# Patient Record
Sex: MALE | Race: WHITE | NOT HISPANIC OR LATINO | Employment: OTHER | URBAN - METROPOLITAN AREA
[De-identification: names, ages, dates, MRNs, and addresses within clinical notes are randomized per-mention and may not be internally consistent; named-entity substitution may affect disease eponyms.]

---

## 2021-12-23 ENCOUNTER — TELEPHONE (OUTPATIENT)
Dept: PODIATRY | Facility: CLINIC | Age: 81
End: 2021-12-23

## 2021-12-27 ENCOUNTER — TELEPHONE (OUTPATIENT)
Dept: FAMILY MEDICINE | Facility: CLINIC | Age: 81
End: 2021-12-27

## 2021-12-28 ENCOUNTER — TELEPHONE (OUTPATIENT)
Dept: PODIATRY | Facility: CLINIC | Age: 81
End: 2021-12-28

## 2022-01-10 ENCOUNTER — OFFICE VISIT (OUTPATIENT)
Dept: PODIATRY | Facility: CLINIC | Age: 82
End: 2022-01-10
Payer: MEDICARE

## 2022-01-10 VITALS
DIASTOLIC BLOOD PRESSURE: 91 MMHG | SYSTOLIC BLOOD PRESSURE: 153 MMHG | HEART RATE: 84 BPM | WEIGHT: 212.63 LBS | TEMPERATURE: 98 F | RESPIRATION RATE: 18 BRPM | BODY MASS INDEX: 27.29 KG/M2 | HEIGHT: 74 IN

## 2022-01-10 DIAGNOSIS — L03.032 PARONYCHIA OF GREAT TOE, LEFT: ICD-10-CM

## 2022-01-10 DIAGNOSIS — L60.0 INGROWN NAIL: Primary | ICD-10-CM

## 2022-01-10 PROCEDURE — 99202 OFFICE O/P NEW SF 15 MIN: CPT | Mod: S$GLB,,, | Performed by: PODIATRIST

## 2022-01-10 PROCEDURE — 99999 PR PBB SHADOW E&M-EST. PATIENT-LVL IV: CPT | Mod: PBBFAC,,, | Performed by: PODIATRIST

## 2022-01-10 PROCEDURE — 99202 PR OFFICE/OUTPT VISIT, NEW, LEVL II, 15-29 MIN: ICD-10-PCS | Mod: S$GLB,,, | Performed by: PODIATRIST

## 2022-01-10 PROCEDURE — 99999 PR PBB SHADOW E&M-EST. PATIENT-LVL IV: ICD-10-PCS | Mod: PBBFAC,,, | Performed by: PODIATRIST

## 2022-01-10 RX ORDER — HYDROCORTISONE 25 MG/G
CREAM TOPICAL
COMMUNITY
Start: 2021-07-22 | End: 2022-07-23

## 2022-01-10 RX ORDER — TAMSULOSIN HYDROCHLORIDE 0.4 MG/1
CAPSULE ORAL
COMMUNITY
Start: 2021-11-26

## 2022-01-10 RX ORDER — PRAVASTATIN SODIUM 80 MG/1
80 TABLET ORAL
COMMUNITY
Start: 2022-01-03

## 2022-01-10 RX ORDER — FINASTERIDE 5 MG/1
5 TABLET, FILM COATED ORAL
COMMUNITY
Start: 2021-10-26

## 2022-01-10 RX ORDER — MONTELUKAST SODIUM 10 MG/1
TABLET ORAL
COMMUNITY
Start: 2021-10-22

## 2022-01-10 RX ORDER — PANTOPRAZOLE SODIUM 40 MG/1
TABLET, DELAYED RELEASE ORAL
COMMUNITY
Start: 2021-11-26

## 2022-01-10 RX ORDER — METFORMIN HYDROCHLORIDE 500 MG/5ML
SOLUTION ORAL
COMMUNITY
Start: 2021-10-22

## 2022-01-10 RX ORDER — LOSARTAN POTASSIUM 25 MG/1
25 TABLET ORAL
COMMUNITY
Start: 2022-01-03

## 2022-01-10 RX ORDER — ASCORBIC ACID 500 MG
500 TABLET ORAL
COMMUNITY
Start: 2021-05-13

## 2022-01-12 PROBLEM — L03.032 PARONYCHIA OF GREAT TOE, LEFT: Status: ACTIVE | Noted: 2022-01-12

## 2022-01-12 PROBLEM — L60.0 INGROWN NAIL: Status: ACTIVE | Noted: 2022-01-12

## 2022-01-12 NOTE — PROGRESS NOTES
Subjective:       Patient ID: Nemesio Davis is a 81 y.o. male.    Chief Complaint: Nail Problem    Patient presents today for a new patient evaluation he is complaining about a painfully ingrown toenail on his left great toe this has been going on for about 2-3 months.  Patient states he has not had chronic problems with ingrowing toenails and this is mainly only painful if something touches his toe.  Past Medical History:   Diagnosis Date    Allergy     GERD (gastroesophageal reflux disease)     Hyperlipidemia     Hypertension      Past Surgical History:   Procedure Laterality Date    ADENOIDECTOMY      APPENDECTOMY      CHOLECYSTECTOMY      TONSILLECTOMY       Family History   Problem Relation Age of Onset    Cancer Mother     No Known Problems Father      Social History     Socioeconomic History    Marital status:    Tobacco Use    Smoking status: Former Smoker    Smokeless tobacco: Never Used    Tobacco comment: Quit 1969   Substance and Sexual Activity    Alcohol use: Not Currently       Current Outpatient Medications   Medication Sig Dispense Refill    ascorbic acid, vitamin C, (VITAMIN C) 500 MG tablet 500 mg.      finasteride (PROSCAR) 5 mg tablet 5 mg.      losartan (COZAAR) 25 MG tablet 25 mg.      metFORMIN 500 mg/5 mL Soln   See Instructions, TAKE 1 TABLET EVERY DAY WITH EVENING MEAL, # 90 tab, 3 Refill(s), Pharmacy: [facility], 186, cm, 06/28/21 9:51:00 CDT, Height/Length Measured, 91.7, kg, 06/28/21 9:51:00 CDT, Weight Dosing      montelukast (SINGULAIR) 10 mg tablet   See Instructions, TAKE 1 TABLET ONE TIME DAILY AS NEEDED FOR ALLERGY SYMPTOMS, # 90 tab, 3 Refill(s), Pharmacy: [facility], 186, cm, 06/28/21 9:51:00 CDT, Height/Length Measured, 91.7, kg, 06/28/21 9:51:00 CDT, Weight Dosing      pantoprazole (PROTONIX) 40 MG tablet   = 1 tab, Oral, Daily, PRN AS NEEDED FOR  INDIGESTION, # 90 tab, 3 Refill(s), Pharmacy: [facility]  "Mail Delivery, 186, cm, 10/26/21 11:13:00 CDT, Height/Length Measured, 96.5, kg, 10/26/21 11:13:00 CDT, Weight Dosing      pravastatin (PRAVACHOL) 80 MG tablet 80 mg.      tamsulosin (FLOMAX) 0.4 mg Cap   = 1 cap, Oral, BID, # 180 cap, 3 Refill(s), Pharmacy: [facility], 186, cm, 10/26/21 11:13:00 CDT, Height/Length Measured, 96.5, kg, 10/26/21 11:13:00 CDT, Weight Dosing      hydrocortisone (ANUSOL-HC) 2.5 % rectal cream   1 es, Rectal, BID, PRN hemorrhoids, # 30 gm, 2 Refill(s), Pharmacy: [id number], 186, cm, 06/28/21 9:51:00 CDT, Height/Length Measured, 91.7, kg, 06/28/21 9:51:00 CDT, Weight Dosing       No current facility-administered medications for this visit.     Review of patient's allergies indicates:   Allergen Reactions    Lisinopril      Other reaction(s): cough       Review of Systems   All other systems reviewed and are negative.      Objective:      Vitals:    01/10/22 1535   BP: (!) 153/91   Pulse: 84   Resp: 18   Temp: 98.2 °F (36.8 °C)   TempSrc: Oral   Weight: 96.4 kg (212 lb 9.6 oz)   Height: 6' 2" (1.88 m)     Physical Exam  Vitals and nursing note reviewed.   Constitutional:       Appearance: Normal appearance.   Cardiovascular:      Pulses:           Dorsalis pedis pulses are 1+ on the right side and 1+ on the left side.        Posterior tibial pulses are 1+ on the right side and 1+ on the left side.   Pulmonary:      Effort: Pulmonary effort is normal.   Musculoskeletal:         General: Tenderness present.   Feet:      Right foot:      Protective Sensation: 2 sites tested. 2 sites sensed.      Toenail Condition: Fungal disease present.     Left foot:      Protective Sensation: 2 sites tested. 2 sites sensed.      Skin integrity: Erythema and warmth present.      Toenail Condition: Left toenails are ingrown. Fungal disease present.  Skin:     Capillary Refill: Capillary refill takes more than 3 seconds.      Findings: Erythema present.   Neurological:      " General: No focal deficit present.      Mental Status: He is alert.   Psychiatric:         Mood and Affect: Mood normal.         Behavior: Behavior normal.         Thought Content: Thought content normal.         Judgment: Judgment normal.                              Assessment:       1. Ingrown nail    2. Paronychia of great toe, left        Plan:       Patient presents today for a new patient evaluation he is complaining about a painfully ingrown toenail on his left great toe this has been going on for about 2-3 months.  Patient states he has not had chronic problems with ingrowing toenails and this is mainly only painful if something touches his toe.  A comprehensive new patient evaluation was performed today patient does have early signs of infection on the medial border of the patient's left hallux nail the area does have positive erythema positive edema and is painful upon direct palpation.  I was able to aggressively trim and remove the ingrowing toenail from the distal medial border left hallux patient noted immediate relief patient did not require a nail avulsion at this time I did recommend monitoring this closely bacitracin ointment and a light dressing applied.  I did recommend the patient start to apply Vicks vapor rub to his toenails twice a day every day advised the patient this will help to control the fungal infection in the nail making the nail softer easier to trim and better able to prevent ingrowing nails.  Plan follow-up will be as needed patient advised if this is not pain-free in 4-5 days he is to contact us for further evaluation and treatment as needed.This note was created using [employer] voice recognition software that occasionally misinterpreted phrases or words.

## 2025-04-30 ENCOUNTER — OFFICE VISIT (OUTPATIENT)
Dept: FAMILY MEDICINE | Facility: CLINIC | Age: 85
End: 2025-04-30
Payer: MEDICARE

## 2025-04-30 VITALS
WEIGHT: 187 LBS | HEIGHT: 74 IN | HEART RATE: 67 BPM | SYSTOLIC BLOOD PRESSURE: 138 MMHG | DIASTOLIC BLOOD PRESSURE: 76 MMHG | OXYGEN SATURATION: 96 % | BODY MASS INDEX: 24 KG/M2

## 2025-04-30 DIAGNOSIS — F02.C0 SEVERE ALZHEIMER'S DEMENTIA, UNSPECIFIED TIMING OF DEMENTIA ONSET, UNSPECIFIED WHETHER BEHAVIORAL, PSYCHOTIC, OR MOOD DISTURBANCE OR ANXIETY: Primary | ICD-10-CM

## 2025-04-30 DIAGNOSIS — G30.9 SEVERE ALZHEIMER'S DEMENTIA, UNSPECIFIED TIMING OF DEMENTIA ONSET, UNSPECIFIED WHETHER BEHAVIORAL, PSYCHOTIC, OR MOOD DISTURBANCE OR ANXIETY: Primary | ICD-10-CM

## 2025-04-30 DIAGNOSIS — L30.0 NUMMULAR ECZEMA: ICD-10-CM

## 2025-04-30 DIAGNOSIS — L21.9 CHRONIC SEBORRHEIC DERMATITIS: ICD-10-CM

## 2025-04-30 PROBLEM — L03.032 PARONYCHIA OF GREAT TOE, LEFT: Status: RESOLVED | Noted: 2022-01-12 | Resolved: 2025-04-30

## 2025-04-30 PROBLEM — L60.0 INGROWN NAIL: Status: RESOLVED | Noted: 2022-01-12 | Resolved: 2025-04-30

## 2025-04-30 PROCEDURE — 99999 PR PBB SHADOW E&M-NEW PATIENT-LVL IV: CPT | Mod: PBBFAC,,, | Performed by: FAMILY MEDICINE

## 2025-04-30 RX ORDER — TRIAMCINOLONE ACETONIDE 1 MG/G
CREAM TOPICAL
Qty: 80 G | Refills: 0 | Status: SHIPPED | OUTPATIENT
Start: 2025-04-30 | End: 2025-05-02 | Stop reason: SDUPTHER

## 2025-04-30 NOTE — PROGRESS NOTES
[facility]      Subjective         Chief Complaint   Patient presents with    Establish Care     No new concerns       84-year-old male presents to establish/PCP.  Here with spouse.  Spouse primary historian, patient diagnosed with severe Alzheimer's disease.  Follows Neurology Dr. [facility] in [facility].  Previously seeing Dr. Wright.  Did not bring prescription bottles and neither can recall what medications the patient is on.  Has follow up with the Neurology in about 3 weeks.  Also follows with Urologist Dr. Mcmillan and had some kind of prostate procedure, but can not elaborate.  Spouse reports patient has dry skin on the side of his face and arms that is itchy and flaky, longstanding.  They have tried over-the-counter HC cream without relief of itch or scale.  Spouse primary caregiver of patient.  Spouse reports she has caregivers hired come to the home to help with daily cares.  Patient remains ambulatory and verbal but very forgetful.  The patient was a previous banker.  They have 2 adult children, 1 locally, 1 lives in [place].    Past Medical History:   Diagnosis Date    Allergy     GERD (gastroesophageal reflux disease)     Hyperlipidemia     Hypertension         Past Surgical History:   Procedure Laterality Date    ADENOIDECTOMY      APPENDECTOMY      CHOLECYSTECTOMY      TONSILLECTOMY          Review of patient's allergies indicates:   Allergen Reactions    Lisinopril      Other reaction(s): cough        Review of Systems   Constitutional: Negative.    HENT: Negative.     Eyes: Negative.    Respiratory: Negative.     Cardiovascular: Negative.    Gastrointestinal: Negative.    Genitourinary: Negative.    Musculoskeletal: Negative.    Skin:         Reports dry skin and flaking on the sides of the face and bilateral forearms   Neurological: Negative.    Endo/Heme/Allergies: Negative.    Psychiatric/Behavioral:  Positive for memory loss. Negative for  "hallucinations, substance abuse and suicidal ideas. The patient is not nervous/anxious and does not have insomnia.                Objective      Vitals:    04/30/25 1040   BP: 138/76   BP Location: Left arm   Patient Position: Sitting   Pulse: 67   SpO2: 96%   Weight: 84.8 kg (187 lb)   Height: 6' 2" (1.88 m)        Physical Exam  Vitals reviewed.   Constitutional:       Appearance: Normal appearance. He is not ill-appearing.   HENT:      Head: Normocephalic and atraumatic.      Right Ear: Tympanic membrane and ear canal normal.      Left Ear: Tympanic membrane and ear canal normal.      Ears:      Comments: Nonobstructive cerumen bilaterally     Nose: Nose normal.      Mouth/Throat:      Mouth: Mucous membranes are moist.      Pharynx: Oropharynx is clear.   Eyes:      Extraocular Movements: Extraocular movements intact.      Conjunctiva/sclera: Conjunctivae normal.   Cardiovascular:      Rate and Rhythm: Normal rate and regular rhythm.      Pulses: Normal pulses.   Pulmonary:      Effort: Pulmonary effort is normal.      Breath sounds: Normal breath sounds.   Abdominal:      General: Abdomen is flat.      Palpations: Abdomen is soft. There is no mass.      Tenderness: There is no abdominal tenderness.   Musculoskeletal:         General: No swelling or tenderness. Normal range of motion.      Cervical back: Neck supple. No tenderness.   Lymphadenopathy:      Cervical: No cervical adenopathy.   Skin:     General: Skin is warm and dry.      Capillary Refill: Capillary refill takes less than 2 seconds.      Comments: Bilateral sides of the face dry flaking skin consistent with seborrheic dermatitis.  Forearms:  2.0-4.0 cm patches round eczematous lesions with thin overlying scale, easily flakes off.  2-5 patches per forearm   Neurological:      General: No focal deficit present.      Mental Status: He is alert.   Psychiatric:         Mood and Affect: Mood normal.         No results found for: "TSH"   No results found " "for: "LABA1C", "HGBA1C" No results found for: "LABA1C", "HGBA1C"  CMP  No results found for: "NA", "K", "CL", "CO2", "GLU", "BUN", "CREATININE", "CALCIUM", "PROT", "ALBUMIN", "BILITOT", "ALKPHOS", "AST", "ALT", "ANIONGAP", "ESTGFRAFRICA", "EGFRNONAA"  @labrcntip(troponini)@  No results found for: "BNP"} No results found for: "CHOL"  No results found for: "HDL"  No results found for: "LDLCALC"  No results found for: "TRIG"  No results found for: "CHOLHDL"       Medication List with Changes/Refills   Current Medications    ASCORBIC ACID, VITAMIN C, (VITAMIN C) 500 MG TABLET    500 mg.    FINASTERIDE (PROSCAR) 5 MG TABLET    5 mg.    LOSARTAN (COZAAR) 25 MG TABLET    25 mg.    METFORMIN 500 MG/5 ML SOLN      See Instructions, TAKE 1 TABLET EVERY DAY WITH EVENING MEAL, # 90 tab, 3 Refill(s), Pharmacy: [facility], 186, cm, 06/28/21 9:51:00 CDT, Height/Length Measured, 91.7, kg, 06/28/21 9:51:00 CDT, Weight Dosing    MONTELUKAST (SINGULAIR) 10 MG TABLET      See Instructions, TAKE 1 TABLET ONE TIME DAILY AS NEEDED FOR ALLERGY SYMPTOMS, # 90 tab, 3 Refill(s), Pharmacy: [facility], 186, cm, 06/28/21 9:51:00 CDT, Height/Length Measured, 91.7, kg, 06/28/21 9:51:00 CDT, Weight Dosing    PANTOPRAZOLE (PROTONIX) 40 MG TABLET      = 1 tab, Oral, Daily, PRN AS NEEDED FOR  INDIGESTION, # 90 tab, 3 Refill(s), Pharmacy: [facility] Mail Delivery, 186, cm, 10/26/21 11:13:00 CDT, Height/Length Measured, 96.5, kg, 10/26/21 11:13:00 CDT, Weight Dosing    PRAVASTATIN (PRAVACHOL) 80 MG TABLET    80 mg.    TAMSULOSIN (FLOMAX) 0.4 MG CAP      = 1 cap, Oral, BID, # 180 cap, 3 Refill(s), Pharmacy: [facility], 186, cm, 10/26/21 11:13:00 CDT, Height/Length Measured, 96.5, kg, 10/26/21 11:13:00 CDT, Weight Dosing           Assessment & Plan     Assessment & Plan  Severe Alzheimer's dementia, unspecified timing of dementia onset, unspecified whether behavioral, psychotic, or mood " disturbance or anxiety  Advanced, severe.  Continue to follow with Neurology Dr. [facility] in [facility].  The spouse is not able to recite the name of any medication that the patient is taking.  She reports they do have a follow up appointment in 3 weeks, recommended patient update records to reflect new PCP.  May benefit from cognitive therapy.  Continues home with spouse with in-home caregiving services.        Nummular eczema  Triamcinolone cream b.i.d. to active outbreaks in any new outbreaks.  May utilize parathyroid and zinc or selenium sulfide daily to help decrease scale       Chronic seborrheic dermatitis  Triamcinolone cream b.i.d. to active outbreaks in any new outbreaks.  May utilize parathyroid and zinc or selenium sulfide daily to help decrease scale          Encouraged spouse to bring all of patient's prescription bottles to ensure accurate entry into the EHR.  Follow up with Neurology 3 weeks.  Follow up with me otherwise in 8 weeks.  Discussed with the patient and spouse they understand and elects to proceed.     Kobe Babin MD  [facility]  [address] [place] [address],[place] [id number] [phone]

## 2025-05-02 RX ORDER — TRIAMCINOLONE ACETONIDE 1 MG/G
CREAM TOPICAL
Qty: 80 G | Refills: 0 | Status: SHIPPED | OUTPATIENT
Start: 2025-05-02

## 2025-05-02 NOTE — TELEPHONE ENCOUNTER
----- Message from Dena sent at 5/2/2025 10:43 AM CDT -----  Regarding: Medications  Hello.. Pt wife stopped by today wanting to know about the cream for his , Cheli Singh stated they don't have it yet.

## 2025-06-05 ENCOUNTER — TELEPHONE (OUTPATIENT)
Dept: FAMILY MEDICINE | Facility: CLINIC | Age: 85
End: 2025-06-05
Payer: MEDICARE

## 2025-06-05 NOTE — TELEPHONE ENCOUNTER
Copied from CRM #[id number]. Topic: General Inquiry - Patient Advice  >> Jun 5, 2025 11:55 AM Uyen wrote:  Type:  Needs Medical Advice    Who Called: pt daughter Sharon Humphries  Would the patient rather a call back or a response via [employer]? call  Best Call Back Number: Sharon   Additional Information: pt daughter would like a call back from the office regarding pt medical records being sent over from [facility]

## 2025-06-25 ENCOUNTER — LAB VISIT (OUTPATIENT)
Dept: LAB | Facility: HOSPITAL | Age: 85
End: 2025-06-25
Attending: FAMILY MEDICINE
Payer: MEDICARE

## 2025-06-25 ENCOUNTER — OFFICE VISIT (OUTPATIENT)
Dept: FAMILY MEDICINE | Facility: CLINIC | Age: 85
End: 2025-06-25
Payer: MEDICARE

## 2025-06-25 VITALS
OXYGEN SATURATION: 97 % | HEIGHT: 74 IN | WEIGHT: 183 LBS | HEART RATE: 60 BPM | SYSTOLIC BLOOD PRESSURE: 132 MMHG | BODY MASS INDEX: 23.49 KG/M2 | DIASTOLIC BLOOD PRESSURE: 70 MMHG

## 2025-06-25 DIAGNOSIS — E78.5 HYPERLIPIDEMIA LDL GOAL <100: ICD-10-CM

## 2025-06-25 DIAGNOSIS — E11.65 TYPE 2 DIABETES MELLITUS WITH HYPERGLYCEMIA, UNSPECIFIED WHETHER LONG TERM INSULIN USE: ICD-10-CM

## 2025-06-25 DIAGNOSIS — F02.B0 MODERATE LATE ONSET ALZHEIMER'S DEMENTIA, UNSPECIFIED WHETHER BEHAVIORAL, PSYCHOTIC, OR MOOD DISTURBANCE OR ANXIETY: ICD-10-CM

## 2025-06-25 DIAGNOSIS — J43.8 OTHER EMPHYSEMA: Primary | ICD-10-CM

## 2025-06-25 DIAGNOSIS — G30.1 MODERATE LATE ONSET ALZHEIMER'S DEMENTIA, UNSPECIFIED WHETHER BEHAVIORAL, PSYCHOTIC, OR MOOD DISTURBANCE OR ANXIETY: ICD-10-CM

## 2025-06-25 LAB
ALBUMIN SERPL BCP-MCNC: 4 G/DL (ref 3.5–5.2)
ALP SERPL-CCNC: 62 UNIT/L (ref 40–150)
ALT SERPL W/O P-5'-P-CCNC: 15 UNIT/L (ref 10–44)
ANION GAP (OHS): 8 MMOL/L (ref 8–16)
AST SERPL-CCNC: 16 UNIT/L (ref 11–45)
BILIRUB SERPL-MCNC: 0.7 MG/DL (ref 0.1–1)
BUN SERPL-MCNC: 11 MG/DL (ref 8–23)
CALCIUM SERPL-MCNC: 9.3 MG/DL (ref 8.7–10.5)
CHLORIDE SERPL-SCNC: 102 MMOL/L (ref 95–110)
CO2 SERPL-SCNC: 26 MMOL/L (ref 23–29)
CREAT SERPL-MCNC: 0.8 MG/DL (ref 0.5–1.4)
EAG (OHS): 143 MG/DL (ref 68–131)
ERYTHROCYTE [DISTWIDTH] IN BLOOD BY AUTOMATED COUNT: 13.5 % (ref 11.5–14.5)
GFR SERPLBLD CREATININE-BSD FMLA CKD-EPI: >60 ML/MIN/1.73/M2
GLUCOSE SERPL-MCNC: 110 MG/DL (ref 70–110)
HBA1C MFR BLD: 6.6 % (ref 4–5.6)
HCT VFR BLD AUTO: 50.1 % (ref 40–54)
HGB BLD-MCNC: 17.7 GM/DL (ref 14–18)
MCH RBC QN AUTO: 31.1 PG (ref 27–31)
MCHC RBC AUTO-ENTMCNC: 35.3 G/DL (ref 32–36)
MCV RBC AUTO: 88 FL (ref 82–98)
PLATELET # BLD AUTO: 270 K/UL (ref 150–450)
PMV BLD AUTO: 8.6 FL (ref 9.2–12.9)
POTASSIUM SERPL-SCNC: 4.5 MMOL/L (ref 3.5–5.1)
PROT SERPL-MCNC: 6.8 GM/DL (ref 6–8.4)
RBC # BLD AUTO: 5.69 M/UL (ref 4.6–6.2)
SODIUM SERPL-SCNC: 136 MMOL/L (ref 136–145)
WBC # BLD AUTO: 7.02 K/UL (ref 3.9–12.7)

## 2025-06-25 PROCEDURE — 99214 OFFICE O/P EST MOD 30 MIN: CPT | Mod: S$GLB,,, | Performed by: FAMILY MEDICINE

## 2025-06-25 PROCEDURE — 82570 ASSAY OF URINE CREATININE: CPT

## 2025-06-25 PROCEDURE — 80053 COMPREHEN METABOLIC PANEL: CPT

## 2025-06-25 PROCEDURE — 1126F AMNT PAIN NOTED NONE PRSNT: CPT | Mod: CPTII,S$GLB,, | Performed by: FAMILY MEDICINE

## 2025-06-25 PROCEDURE — 99999 PR PBB SHADOW E&M-EST. PATIENT-LVL III: CPT | Mod: PBBFAC,,, | Performed by: FAMILY MEDICINE

## 2025-06-25 PROCEDURE — 3075F SYST BP GE 130 - 139MM HG: CPT | Mod: CPTII,S$GLB,, | Performed by: FAMILY MEDICINE

## 2025-06-25 PROCEDURE — 83036 HEMOGLOBIN GLYCOSYLATED A1C: CPT

## 2025-06-25 PROCEDURE — 3078F DIAST BP <80 MM HG: CPT | Mod: CPTII,S$GLB,, | Performed by: FAMILY MEDICINE

## 2025-06-25 PROCEDURE — 36415 COLL VENOUS BLD VENIPUNCTURE: CPT

## 2025-06-25 PROCEDURE — 1101F PT FALLS ASSESS-DOCD LE1/YR: CPT | Mod: CPTII,S$GLB,, | Performed by: FAMILY MEDICINE

## 2025-06-25 PROCEDURE — 3288F FALL RISK ASSESSMENT DOCD: CPT | Mod: CPTII,S$GLB,, | Performed by: FAMILY MEDICINE

## 2025-06-25 PROCEDURE — G2211 COMPLEX E/M VISIT ADD ON: HCPCS | Mod: S$GLB,,, | Performed by: FAMILY MEDICINE

## 2025-06-25 PROCEDURE — 85027 COMPLETE CBC AUTOMATED: CPT

## 2025-06-25 PROCEDURE — 1159F MED LIST DOCD IN RCRD: CPT | Mod: CPTII,S$GLB,, | Performed by: FAMILY MEDICINE

## 2025-06-25 RX ORDER — PRAVASTATIN SODIUM 80 MG/1
80 TABLET ORAL NIGHTLY
Qty: 90 TABLET | Refills: 0 | Status: SHIPPED | OUTPATIENT
Start: 2025-06-25

## 2025-06-25 RX ORDER — METFORMIN HYDROCHLORIDE EXTENDED-RELEASE TABLETS 500 MG/1
500 TABLET, FILM COATED, EXTENDED RELEASE ORAL 2 TIMES DAILY WITH MEALS
Qty: 60 TABLET | Refills: 3 | Status: SHIPPED | OUTPATIENT
Start: 2025-06-25 | End: 2026-06-25

## 2025-06-25 NOTE — ASSESSMENT & PLAN NOTE
Resolved, patient quit smoking many years ago.  No breathing difficulties cough or sputum production.

## 2025-06-25 NOTE — PROGRESS NOTES
[facility]      Subjective         Chief Complaint   Patient presents with    Follow-up      Pleasant 84-year-old male with known moderate-severe Alzheimer's type dementia, T2 dm, history of emphysema, hyperlipidemia, GERD presents for follow up.  Spouse present primary historian.  The patient remains communicative but very advanced with his dementia unable to recall names of family and friends.  He is visually alert, responds to communication.  Lives at home with his wife.  She has questions about placement.  Follows with Neurology.  Apparently unable to tolerate Aricept owing to GI irritation therefore discontinued.  Has 1 daughter lives in [facility], the other in [place].  The local daughter is able to help some with [employer] caregiving.  The patient remains ambulatory and is able to feed self but can not drive.  The spouse report she found him on the neighbor's door step the other evening.  Apparent sundowning by description of the spouse.    Past Medical History:   Diagnosis Date    Allergy     GERD (gastroesophageal reflux disease)     Hyperlipidemia     Hypertension         Past Surgical History:   Procedure Laterality Date    ADENOIDECTOMY      APPENDECTOMY      CHOLECYSTECTOMY      TONSILLECTOMY          Review of patient's allergies indicates:   Allergen Reactions    Lisinopril      Other reaction(s): cough        Review of Systems   Constitutional: Negative.    HENT: Negative.     Eyes: Negative.    Respiratory: Negative.     Cardiovascular: Negative.    Gastrointestinal: Negative.    Genitourinary: Negative.    Musculoskeletal: Negative.    Skin: Negative.    Neurological: Negative.    Endo/Heme/Allergies: Negative.    Psychiatric/Behavioral:  Positive for memory loss.         The mood is pleasant, demeanor is calm.  Good eye contact.  Language skills are poor.  Poor word finding.  Recalls 1/3 items after 2 minutes.  Follows simple directions.            "    Objective      Vitals:    06/25/25 1309   BP: 132/70   BP Location: Left arm   Patient Position: Sitting   Pulse: 60   SpO2: 97%   Weight: 83 kg (183 lb)   Height: 6' 2" (1.88 m)        Physical Exam  Vitals reviewed.   Constitutional:       Appearance: Normal appearance. He is not ill-appearing.   HENT:      Head: Normocephalic and atraumatic.      Right Ear: Tympanic membrane and ear canal normal.      Left Ear: Tympanic membrane and ear canal normal.      Ears:      Comments: Nonobstructive cerumen bilaterally     Nose: Nose normal.      Mouth/Throat:      Mouth: Mucous membranes are moist.      Pharynx: Oropharynx is clear.   Eyes:      Extraocular Movements: Extraocular movements intact.      Conjunctiva/sclera: Conjunctivae normal.   Cardiovascular:      Rate and Rhythm: Normal rate and regular rhythm.      Pulses: Normal pulses.   Pulmonary:      Effort: Pulmonary effort is normal.      Breath sounds: Normal breath sounds.   Abdominal:      General: Abdomen is flat.      Palpations: Abdomen is soft. There is no mass.      Tenderness: There is no abdominal tenderness.   Musculoskeletal:         General: No swelling or tenderness. Normal range of motion.      Cervical back: Neck supple. No tenderness.   Lymphadenopathy:      Cervical: No cervical adenopathy.   Skin:     General: Skin is warm and dry.      Capillary Refill: Capillary refill takes less than 2 seconds.   Neurological:      Mental Status: He is alert.      Gait: Gait abnormal.      Comments: Oriented x1, short-term item recall 1/3 items after 60 seconds.  0/3 items after 120 seconds.  Sit to stand characterize by rocking and initiation difficulty.  8 seconds.  Initial step shuffled stooped posture reduced arm swing and stride length.  Uses cane.   Psychiatric:         Mood and Affect: Mood normal.         No results found for: "TSH"     Lab Results   Component Value Date    HGBA1C 6.6 (H) 06/25/2025      Lab Results   Component Value Date    " "HGBA1C 6.6 (H) 06/25/2025     CMP  Sodium   Date Value Ref Range Status   06/25/2025 136 136 - 145 mmol/L Final     Potassium   Date Value Ref Range Status   06/25/2025 4.5 3.5 - 5.1 mmol/L Final     Chloride   Date Value Ref Range Status   06/25/2025 102 95 - 110 mmol/L Final     CO2   Date Value Ref Range Status   06/25/2025 26 23 - 29 mmol/L Final     Glucose   Date Value Ref Range Status   06/25/2025 110 70 - 110 mg/dL Final     BUN   Date Value Ref Range Status   06/25/2025 11 8 - 23 mg/dL Final     Creatinine   Date Value Ref Range Status   06/25/2025 0.8 0.5 - 1.4 mg/dL Final     Calcium   Date Value Ref Range Status   06/25/2025 9.3 8.7 - 10.5 mg/dL Final     Protein Total   Date Value Ref Range Status   06/25/2025 6.8 6.0 - 8.4 gm/dL Final     Albumin   Date Value Ref Range Status   06/25/2025 4.0 3.5 - 5.2 g/dL Final     Bilirubin Total   Date Value Ref Range Status   06/25/2025 0.7 0.1 - 1.0 mg/dL Final     Comment:     For infants and newborns, interpretation of results should be based   on gestational age, weight and in agreement with clinical   observations.    Premature Infant recommended reference ranges:   0-24 hours:  <8.0 mg/dL   24-48 hours: <12.0 mg/dL   3-5 days:    <15.0 mg/dL   6-29 days:   <15.0 mg/dL     ALP   Date Value Ref Range Status   06/25/2025 62 40 - 150 unit/L Final     AST   Date Value Ref Range Status   06/25/2025 16 11 - 45 unit/L Final     ALT   Date Value Ref Range Status   06/25/2025 15 10 - 44 unit/L Final     Anion Gap   Date Value Ref Range Status   06/25/2025 8 8 - 16 mmol/L Final    No results found for: "CHOL"  No results found for: "HDL"  No results found for: "LDLCALC"  No results found for: "TRIG"  No results found for: "CHOLHDL"       Medication List with Changes/Refills   New Medications    METFORMIN (FORTAMET) 500 MG 24HR TABLET    Take 1 tablet (500 mg total) by mouth 2 (two) times daily with meals.   Current Medications    ASCORBIC ACID, VITAMIN C, (VITAMIN C) " 500 MG TABLET    500 mg.    LOSARTAN (COZAAR) 25 MG TABLET    25 mg.    TAMSULOSIN (FLOMAX) 0.4 MG CAP      = 1 cap, Oral, BID, # 180 cap, 3 Refill(s), Pharmacy: [facility], 186, cm, 10/26/21 11:13:00 CDT, Height/Length Measured, 96.5, kg, 10/26/21 11:13:00 CDT, Weight Dosing    TRIAMCINOLONE ACETONIDE 0.1% (KENALOG) 0.1 % CREAM    Apply thin layer 2 times per day to rough areas of forearms and sides of face x7 days.   Changed and/or Refilled Medications    Modified Medication Previous Medication    PRAVASTATIN (PRAVACHOL) 80 MG TABLET pravastatin (PRAVACHOL) 80 MG tablet       Take 1 tablet (80 mg total) by mouth every evening.    80 mg.   Discontinued Medications    FINASTERIDE (PROSCAR) 5 MG TABLET    5 mg.    METFORMIN 500 MG/5 ML SOLN        MONTELUKAST (SINGULAIR) 10 MG TABLET      See Instructions, TAKE 1 TABLET ONE TIME DAILY AS NEEDED FOR ALLERGY SYMPTOMS, # 90 tab, 3 Refill(s), Pharmacy: [facility], 186, cm, 06/28/21 9:51:00 CDT, Height/Length Measured, 91.7, kg, 06/28/21 9:51:00 CDT, Weight Dosing    PANTOPRAZOLE (PROTONIX) 40 MG TABLET               Assessment & Plan     Assessment & Plan  Type 2 diabetes mellitus with hyperglycemia, unspecified whether long term insulin use  A1c ordered today.  Target A1c goal less than 8.0%.  Not checking blood sugars at home, this will be very difficult for wife to complete.    Orders:    Comprehensive Metabolic Panel; Future    CBC Without Differential; Future    Hemoglobin A1C; Future    Microalbumin/Creatinine Ratio, Urine; Future    Other emphysema  Resolved, patient quit smoking many years ago.  No breathing difficulties cough or sputum production.       Moderate late onset Alzheimer's dementia, unspecified whether behavioral, psychotic, or mood disturbance or anxiety  Follow up Neurology.  Apparent sundowning per spouse, energy increasing around 12-1 p.m..  Difficulty getting down to sleep may benefit from Risperdal or  similar agent.  To discuss with their neurologist.       Hyperlipidemia LDL goal <100  Continue pravastatin 80 mg high-intensity statin therapy.  Fasting lipid profile ordered.  LDL goal less than 100 mg/dL.  Low-cholesterol diet advised.             Long discussion with spouse regarding placement, assisted living, in-home caregiving, continuing neuro degenerative changes that will affect caregiving, speech and language, feeding, and activities of daily living and we will require family support.  I have encouraged her to visit some local facilities including memory care and skilled nursing facilities as well as assisted living facilities.  She is encouraged to call the office with questions or concerns.  Discussed she understands and elects to proceed.    Kobe Babin MD  [facility]  [address] [place] [address],[place] [id number] [phone]

## 2025-06-25 NOTE — TELEPHONE ENCOUNTER
Unable to reach Patient at their preferred number. LM.     He is being seen today at 1pm. I will clarify with him at this visit.

## 2025-06-25 NOTE — ASSESSMENT & PLAN NOTE
A1c ordered today.  Target A1c goal less than 8.0%.  Not checking blood sugars at home, this will be very difficult for wife to complete.    Orders:    Comprehensive Metabolic Panel; Future    CBC Without Differential; Future    Hemoglobin A1C; Future    Microalbumin/Creatinine Ratio, Urine; Future

## 2025-06-26 LAB
ALBUMIN/CREAT UR: 6 UG/MG
CREAT UR-MCNC: 168 MG/DL (ref 23–375)
MICROALBUMIN UR-MCNC: 10 UG/ML (ref ?–5000)

## 2025-07-01 ENCOUNTER — TELEPHONE (OUTPATIENT)
Dept: FAMILY MEDICINE | Facility: CLINIC | Age: 85
End: 2025-07-01
Payer: MEDICARE

## 2025-07-01 DIAGNOSIS — Z13.9 SCREENING DUE: ICD-10-CM

## 2025-07-01 DIAGNOSIS — I10 PRIMARY HYPERTENSION: Primary | ICD-10-CM

## 2025-07-01 NOTE — TELEPHONE ENCOUNTER
Paperwork downstairs for MD completion.   Informed spouse she will be contacted once paperwork completed. Spouse voiced understanding.

## 2025-07-01 NOTE — TELEPHONE ENCOUNTER
----- Message from Dena sent at 7/1/2025 11:33 AM CDT -----  Regarding: Paperwork  Hello.. Pt wife's dropped off paperwork for Dr. Babin to look at. It's by the registration. Pt wife also statedto call her at  [phone].

## 2025-07-09 ENCOUNTER — TELEPHONE (OUTPATIENT)
Dept: FAMILY MEDICINE | Facility: CLINIC | Age: 85
End: 2025-07-09
Payer: MEDICARE

## 2025-07-09 NOTE — TELEPHONE ENCOUNTER
"Copied from CRM #[id number]. Topic: General Inquiry - Status Check  >> Jul 9, 2025  8:09 AM Benedict wrote:  Type:  Needs Medical Advice    Who Called: Pt Wife Sandra Davis     Would the patient rather a call back or a response via [facility]? Call   Best Call Back Number: [phone]     Additional Information: Pt wife calling to check on the status of paperwork pertaining to assisting living "[facility]" in [place]. PT wife state all paperwork was left with . Pt wife needs clarity of how the paperwork will be completed, and if she needs to schedule an appt for evaluation as soon a possible. She also want to know how will it be sent whether its email, mail, or for her to pick it up.  Please call to address her concerns and questions  "

## 2025-07-09 NOTE — TELEPHONE ENCOUNTER
Spouse dropped paperwork off for assisted living on 7/3/25 as pt seen in office 6/25/25 w/ MD ZULUAGA recommended assisted living. Staff called pt to schedule appt for paperwork. Is that necessary?

## 2025-07-11 ENCOUNTER — OFFICE VISIT (OUTPATIENT)
Dept: FAMILY MEDICINE | Facility: CLINIC | Age: 85
End: 2025-07-11
Payer: MEDICARE

## 2025-07-11 ENCOUNTER — HOSPITAL ENCOUNTER (OUTPATIENT)
Dept: RADIOLOGY | Facility: HOSPITAL | Age: 85
Discharge: HOME | End: 2025-07-11
Attending: FAMILY MEDICINE
Payer: MEDICARE

## 2025-07-11 VITALS
OXYGEN SATURATION: 98 % | DIASTOLIC BLOOD PRESSURE: 82 MMHG | BODY MASS INDEX: 23.87 KG/M2 | SYSTOLIC BLOOD PRESSURE: 126 MMHG | HEIGHT: 74 IN | HEART RATE: 55 BPM | WEIGHT: 186 LBS

## 2025-07-11 DIAGNOSIS — E78.2 MIXED HYPERLIPIDEMIA: ICD-10-CM

## 2025-07-11 DIAGNOSIS — I10 PRIMARY HYPERTENSION: ICD-10-CM

## 2025-07-11 DIAGNOSIS — F03.90 DEMENTIA, UNSPECIFIED DEMENTIA SEVERITY, UNSPECIFIED DEMENTIA TYPE, UNSPECIFIED WHETHER BEHAVIORAL, PSYCHOTIC, OR MOOD DISTURBANCE OR ANXIETY: ICD-10-CM

## 2025-07-11 DIAGNOSIS — Z11.1 SCREENING-PULMONARY TB: Primary | ICD-10-CM

## 2025-07-11 DIAGNOSIS — E11.65 TYPE 2 DIABETES MELLITUS WITH HYPERGLYCEMIA, UNSPECIFIED WHETHER LONG TERM INSULIN USE: ICD-10-CM

## 2025-07-11 PROCEDURE — 99214 OFFICE O/P EST MOD 30 MIN: CPT | Mod: S$GLB,,, | Performed by: FAMILY MEDICINE

## 2025-07-11 PROCEDURE — G2211 COMPLEX E/M VISIT ADD ON: HCPCS | Mod: S$GLB,,, | Performed by: FAMILY MEDICINE

## 2025-07-11 PROCEDURE — 1126F AMNT PAIN NOTED NONE PRSNT: CPT | Mod: CPTII,S$GLB,, | Performed by: FAMILY MEDICINE

## 2025-07-11 PROCEDURE — 71046 X-RAY EXAM CHEST 2 VIEWS: CPT | Mod: 26,,, | Performed by: RADIOLOGY

## 2025-07-11 PROCEDURE — 3079F DIAST BP 80-89 MM HG: CPT | Mod: CPTII,S$GLB,, | Performed by: FAMILY MEDICINE

## 2025-07-11 PROCEDURE — 71046 X-RAY EXAM CHEST 2 VIEWS: CPT | Mod: TC

## 2025-07-11 PROCEDURE — 1159F MED LIST DOCD IN RCRD: CPT | Mod: CPTII,S$GLB,, | Performed by: FAMILY MEDICINE

## 2025-07-11 PROCEDURE — 3074F SYST BP LT 130 MM HG: CPT | Mod: CPTII,S$GLB,, | Performed by: FAMILY MEDICINE

## 2025-07-11 PROCEDURE — 99999 PR PBB SHADOW E&M-EST. PATIENT-LVL III: CPT | Mod: PBBFAC,,, | Performed by: FAMILY MEDICINE

## 2025-07-11 NOTE — TELEPHONE ENCOUNTER
Unable to reach Patient at their preferred number. LM.     Forms here, Provider reviewed and needs to speak with  or Nemesio.     Booked a 4pm virtual with pcp to review these forms. If unable to make this they are instructed to reschedule.

## 2025-07-14 PROBLEM — E78.2 MIXED HYPERLIPIDEMIA: Status: ACTIVE | Noted: 2025-07-14

## 2025-07-14 PROBLEM — Z11.1 SCREENING-PULMONARY TB: Status: RESOLVED | Noted: 2025-07-14 | Resolved: 2025-07-14

## 2025-07-14 PROBLEM — Z11.1 SCREENING-PULMONARY TB: Status: ACTIVE | Noted: 2025-07-14

## 2025-07-14 PROBLEM — F03.90 DEMENTIA: Status: ACTIVE | Noted: 2025-07-14

## 2025-07-14 NOTE — ASSESSMENT & PLAN NOTE
Stable at present, [facility]  Patient brings admission form/POC and post POST orders to complete.  These are reviewed with both the patient and spouse in detail.  Forms complete.  Copies to facility/chart.  TB gold ordered.

## 2025-07-14 NOTE — ASSESSMENT & PLAN NOTE
May continue Metformin.  Previous lab reviewed.  Reasonable control.  Recommend dilated eye exam annually.  Recommend foot exam annual.

## 2025-07-14 NOTE — ASSESSMENT & PLAN NOTE
Ordered per admission requirements assisted living facility    Orders:    Quantiferon Gold TB; Future

## 2025-07-14 NOTE — PROGRESS NOTES
[facility]      Subjective         Chief Complaint   Patient presents with    Letter for School/Work      Pleasant 84-year-old  male with known dementia, T2 dm, hyperlipidemia presents in preparation for admission to local memory care unit.  Spouse present.  Both Mr. Davis and spouse are preparing for him to enter the [facility]- memory care unit, the spouse plans to move into the assisted living apartments to be near her .  Spouse reports Blood sugars have been stable, when they check, less than 130 mg/dL fasting.  The patient relys on his spouse to prepare meals, and eats a variety of proteins, carbs, with reduced fat diet.  The spouse report there have been no behavior problems, sundowning, but has difficulty with conversational level language/memory.  The patient continues to ambulate independently, and remains communicative, although confabulates.  Short-term memory is poor.  They do not desire referral for assessing the degree of dementia, or further treatment at this time.    Past Medical History:   Diagnosis Date    Allergy     GERD (gastroesophageal reflux disease)     Hyperlipidemia     Hypertension         Past Surgical History:   Procedure Laterality Date    ADENOIDECTOMY      APPENDECTOMY      CHOLECYSTECTOMY      TONSILLECTOMY          Review of patient's allergies indicates:   Allergen Reactions    Lisinopril      Other reaction(s): cough        Review of Systems   Constitutional: Negative.    HENT: Negative.     Eyes: Negative.    Respiratory: Negative.     Cardiovascular: Negative.    Gastrointestinal: Negative.    Musculoskeletal: Negative.    Skin: Negative.    Neurological:  Positive for speech change.        Speech intact.   Endo/Heme/Allergies: Negative.    Psychiatric/Behavioral:  Positive for memory loss. Negative for depression. The patient is not nervous/anxious.                Objective      Vitals:    07/11/25 1554  "  BP: 126/82   BP Location: Left arm   Patient Position: Sitting   Pulse: (!) 55   SpO2: 98%   Weight: 84.4 kg (186 lb)   Height: 6' 2" (1.88 m)        Physical Exam  Vitals reviewed.   Constitutional:       Appearance: Normal appearance. He is not ill-appearing.   HENT:      Head: Normocephalic and atraumatic.      Right Ear: Tympanic membrane and ear canal normal.      Left Ear: Tympanic membrane and ear canal normal.      Ears:      Comments: Nonobstructive cerumen bilaterally     Mouth/Throat:      Mouth: Mucous membranes are moist.      Pharynx: Oropharynx is clear.   Eyes:      Extraocular Movements: Extraocular movements intact.      Conjunctiva/sclera: Conjunctivae normal.   Cardiovascular:      Rate and Rhythm: Normal rate and regular rhythm.      Pulses: Normal pulses.   Pulmonary:      Effort: Pulmonary effort is normal.      Breath sounds: Normal breath sounds.   Abdominal:      General: Abdomen is flat.      Palpations: Abdomen is soft. There is no mass.   Musculoskeletal:         General: No swelling. Normal range of motion.      Cervical back: Normal range of motion and neck supple.      Right lower leg: No edema.      Left lower leg: No edema.   Skin:     General: Skin is warm and dry.   Neurological:      Mental Status: He is alert. Mental status is at baseline.      Comments: A&O x1.  Pleasant demeanor.  Short-term memory poor.  Confabulates.   Psychiatric:         Mood and Affect: Mood normal.         No results found for: "TSH"     Lab Results   Component Value Date    HGBA1C 6.6 (H) 06/25/2025      Lab Results   Component Value Date    HGBA1C 6.6 (H) 06/25/2025     CMP  Sodium   Date Value Ref Range Status   06/25/2025 136 136 - 145 mmol/L Final     Potassium   Date Value Ref Range Status   06/25/2025 4.5 3.5 - 5.1 mmol/L Final     Chloride   Date Value Ref Range Status   06/25/2025 102 95 - 110 mmol/L Final     CO2   Date Value Ref Range Status   06/25/2025 26 23 - 29 mmol/L Final     Glucose " "  Date Value Ref Range Status   06/25/2025 110 70 - 110 mg/dL Final     BUN   Date Value Ref Range Status   06/25/2025 11 8 - 23 mg/dL Final     Creatinine   Date Value Ref Range Status   06/25/2025 0.8 0.5 - 1.4 mg/dL Final     Calcium   Date Value Ref Range Status   06/25/2025 9.3 8.7 - 10.5 mg/dL Final     Protein Total   Date Value Ref Range Status   06/25/2025 6.8 6.0 - 8.4 gm/dL Final     Albumin   Date Value Ref Range Status   06/25/2025 4.0 3.5 - 5.2 g/dL Final     Bilirubin Total   Date Value Ref Range Status   06/25/2025 0.7 0.1 - 1.0 mg/dL Final     Comment:     For infants and newborns, interpretation of results should be based   on gestational age, weight and in agreement with clinical   observations.    Premature Infant recommended reference ranges:   0-24 hours:  <8.0 mg/dL   24-48 hours: <12.0 mg/dL   3-5 days:    <15.0 mg/dL   6-29 days:   <15.0 mg/dL     ALP   Date Value Ref Range Status   06/25/2025 62 40 - 150 unit/L Final     AST   Date Value Ref Range Status   06/25/2025 16 11 - 45 unit/L Final     ALT   Date Value Ref Range Status   06/25/2025 15 10 - 44 unit/L Final     Anion Gap   Date Value Ref Range Status   06/25/2025 8 8 - 16 mmol/L Final    No results found for: "CHOL"  No results found for: "HDL"  No results found for: "LDLCALC"  No results found for: "TRIG"  No results found for: "CHOLHDL"       Medication List with Changes/Refills   Current Medications    METFORMIN (FORTAMET) 500 MG 24HR TABLET    Take 1 tablet (500 mg total) by mouth 2 (two) times daily with meals.    PRAVASTATIN (PRAVACHOL) 80 MG TABLET    Take 1 tablet (80 mg total) by mouth every evening.   Discontinued Medications    ASCORBIC ACID, VITAMIN C, (VITAMIN C) 500 MG TABLET    500 mg.    LOSARTAN (COZAAR) 25 MG TABLET    25 mg.    TAMSULOSIN (FLOMAX) 0.4 MG CAP      = 1 cap, Oral, BID, # 180 cap, 3 Refill(s), Pharmacy: [facility], 186, cm, 10/26/21 11:13:00 CDT, Height/Length Measured, 96.5, kg, " 10/26/21 11:13:00 CDT, Weight Dosing    TRIAMCINOLONE ACETONIDE 0.1% (KENALOG) 0.1 % CREAM    Apply thin layer 2 times per day to rough areas of forearms and sides of face x7 days.           Assessment & Plan     Assessment & Plan  Dementia, unspecified dementia severity, unspecified dementia type, unspecified whether behavioral, psychotic, or mood disturbance or anxiety  Stable at present, [facility]  Patient brings admission form/POC and post POST orders to complete.  These are reviewed with both the patient and spouse in detail.  Forms complete.  Copies to facility/chart.  TB gold ordered.       Screening-pulmonary TB  Ordered per admission requirements assisted living facility    Orders:    Quantiferon Gold TB; Future    Mixed hyperlipidemia  Not currently on statin therapy.  Defers at this time.         Type 2 diabetes mellitus with hyperglycemia, unspecified whether long term insulin use  May continue Metformin.  Previous lab reviewed.  Reasonable control.  Recommend dilated eye exam annually.  Recommend foot exam annual.               Kobe Babin MD  [facility]  [address] [place] [address]  [phone]

## 2025-07-22 ENCOUNTER — TELEPHONE (OUTPATIENT)
Dept: FAMILY MEDICINE | Facility: CLINIC | Age: 85
End: 2025-07-22
Payer: MEDICARE

## 2025-07-22 NOTE — TELEPHONE ENCOUNTER
Spoke with wife, quantiferon was not completed, lab appointment was no showed. Lab rescheduled for today at 4:00 pm, pt spouse verbalized acceptance.

## 2025-07-22 NOTE — TELEPHONE ENCOUNTER
----- Message from Tamra sent at 7/22/2025  2:44 PM CDT -----  Dr Babin staff, Nemesio Davis MRN # [id number] wife is down stairs to pickup his completed documents for the long term care. I do not see anything down stairs, do you have it upstairs?    Please call Mrs. Flores at [phone].     Tamra TEJADA

## 2025-07-22 NOTE — LETTER
July 22, 2025        NEMESIO MCGREGORTori Ryan  [address]  [place] [address]             [facility]  [address]  [place] [address]  Phone: [phone]  Fax: [phone]   Patient: Nemesio Davis   MR Number: [id number]   YOB: 1940   Date of Visit: 7/22/2025     Dear Dr. Davis,     Current Outpatient Medications on File Prior to Visit   Medication Sig Dispense Refill    metFORMIN (FORTAMET) 500 mg 24hr tablet Take 1 tablet (500 mg total) by mouth 2 (two) times daily with meals. 60 tablet 3    pravastatin (PRAVACHOL) 80 MG tablet Take 1 tablet (80 mg total) by mouth every evening. 90 tablet 0     No current facility-administered medications on file prior to visit.        Sincerely,      Kobe Babin MD            CC  No Recipients    Enclosure        89

## 2025-07-23 ENCOUNTER — LAB VISIT (OUTPATIENT)
Dept: LAB | Facility: HOSPITAL | Age: 85
End: 2025-07-23
Attending: FAMILY MEDICINE
Payer: MEDICARE

## 2025-07-23 DIAGNOSIS — Z13.9 SCREENING DUE: ICD-10-CM

## 2025-07-23 PROCEDURE — 86480 TB TEST CELL IMMUN MEASURE: CPT

## 2025-07-23 PROCEDURE — 36415 COLL VENOUS BLD VENIPUNCTURE: CPT

## 2025-07-24 LAB
MITOGEN MINUS NIL (OHS): 9.98
NIL TB SYNCED (OHS): 0.02
QUANTIFERON GOLD INTERP (OHS): NEGATIVE
TB1 AG MINUS NIL (OHS): 0.02
TB2 AG MINUS NIL (OHS): 0

## 2025-07-25 NOTE — TELEPHONE ENCOUNTER
TB is in and all forms have been reviewed by Provider. Faxing over to [facility] [phone].     2 copies at the  for his wife to .

## 2025-07-30 ENCOUNTER — TELEPHONE (OUTPATIENT)
Dept: FAMILY MEDICINE | Facility: CLINIC | Age: 85
End: 2025-07-30
Payer: MEDICARE

## 2025-07-30 NOTE — TELEPHONE ENCOUNTER
Copied from CRM #[id number]. Topic: General Inquiry - Patient Advice  >> Jul 30, 2025  1:59 PM Codie wrote:  Type:  Needs Medical Advice    Who Called: nico with memory care  Would the patient rather a call back or a response via [facility]? call  Best Call Back Number: [phone]  Additional Information: [facility] call

## 2025-07-30 NOTE — TELEPHONE ENCOUNTER
Returned call to memory Care unit, spoke with Antonette Whitman received paperwork back from MD    Back page physician order, was marked as does not qualify for assisted living.   Please correct, have MD initial correction. Pt does qualify for assisted living   Fax correct paperwork back

## 2025-08-19 ENCOUNTER — TELEPHONE (OUTPATIENT)
Dept: FAMILY MEDICINE | Facility: CLINIC | Age: 85
End: 2025-08-19
Payer: MEDICARE